# Patient Record
Sex: FEMALE | Race: WHITE | NOT HISPANIC OR LATINO | URBAN - METROPOLITAN AREA
[De-identification: names, ages, dates, MRNs, and addresses within clinical notes are randomized per-mention and may not be internally consistent; named-entity substitution may affect disease eponyms.]

---

## 2018-01-01 ENCOUNTER — OFFICE (OUTPATIENT)
Dept: URBAN - METROPOLITAN AREA CLINIC 75 | Facility: CLINIC | Age: 78
End: 2018-01-01

## 2018-01-01 VITALS
DIASTOLIC BLOOD PRESSURE: 70 MMHG | HEART RATE: 74 BPM | HEIGHT: 61 IN | SYSTOLIC BLOOD PRESSURE: 123 MMHG | WEIGHT: 137 LBS

## 2018-01-01 DIAGNOSIS — D50.9 IRON DEFICIENCY ANEMIA, UNSPECIFIED: ICD-10-CM

## 2018-01-01 DIAGNOSIS — K52.89 OTHER SPECIFIED NONINFECTIVE GASTROENTERITIS AND COLITIS: ICD-10-CM

## 2018-01-01 DIAGNOSIS — R93.3 ABNORMAL FINDINGS ON DIAGNOSTIC IMAGING OF OTHER PARTS OF DI: ICD-10-CM

## 2018-01-01 DIAGNOSIS — K21.9 GASTRO-ESOPHAGEAL REFLUX DISEASE WITHOUT ESOPHAGITIS: ICD-10-CM

## 2018-01-01 DIAGNOSIS — K30 FUNCTIONAL DYSPEPSIA: ICD-10-CM

## 2018-01-01 DIAGNOSIS — K80.20 CALCULUS OF GALLBLADDER WITHOUT CHOLECYSTITIS WITHOUT OBSTRU: ICD-10-CM

## 2018-01-01 DIAGNOSIS — Z79.02 LONG TERM (CURRENT) USE OF ANTITHROMBOTICS/ANTIPLATELETS: ICD-10-CM

## 2018-01-01 DIAGNOSIS — I25.10 ATHEROSCLEROTIC HEART DISEASE OF NATIVE CORONARY ARTERY WITH: ICD-10-CM

## 2018-01-01 DIAGNOSIS — Z95.5 PRESENCE OF CORONARY ANGIOPLASTY IMPLANT AND GRAFT: ICD-10-CM

## 2018-01-01 DIAGNOSIS — Z79.82 LONG TERM (CURRENT) USE OF ASPIRIN: ICD-10-CM

## 2018-01-01 PROCEDURE — 99213 OFFICE O/P EST LOW 20 MIN: CPT | Performed by: INTERNAL MEDICINE

## 2018-01-01 RX ORDER — BUDESONIDE 3 MG/1
9 CAPSULE ORAL
Qty: 90 | Refills: 5 | Status: COMPLETED
Start: 2018-01-01 | End: 2019-01-01

## 2018-01-01 RX ORDER — ESOMEPRAZOLE MAGNESIUM 40 MG/1
40 CAPSULE, DELAYED RELEASE ORAL
Qty: 30 | Refills: 11 | Status: COMPLETED
Start: 2018-07-11 | End: 2018-01-01

## 2018-06-26 ENCOUNTER — OFFICE VISIT CONVERTED (OUTPATIENT)
Dept: ONCOLOGY | Facility: HOSPITAL | Age: 78
End: 2018-06-26
Attending: INTERNAL MEDICINE

## 2018-07-11 ENCOUNTER — OFFICE (OUTPATIENT)
Dept: URBAN - METROPOLITAN AREA CLINIC 75 | Facility: CLINIC | Age: 78
End: 2018-07-11

## 2018-07-11 VITALS
HEIGHT: 61 IN | DIASTOLIC BLOOD PRESSURE: 58 MMHG | HEART RATE: 64 BPM | SYSTOLIC BLOOD PRESSURE: 116 MMHG | WEIGHT: 137 LBS

## 2018-07-11 DIAGNOSIS — D50.9 IRON DEFICIENCY ANEMIA, UNSPECIFIED: ICD-10-CM

## 2018-07-11 DIAGNOSIS — R93.3 ABNORMAL FINDINGS ON DIAGNOSTIC IMAGING OF OTHER PARTS OF DI: ICD-10-CM

## 2018-07-11 DIAGNOSIS — K21.9 GASTRO-ESOPHAGEAL REFLUX DISEASE WITHOUT ESOPHAGITIS: ICD-10-CM

## 2018-07-11 DIAGNOSIS — K80.20 CALCULUS OF GALLBLADDER WITHOUT CHOLECYSTITIS WITHOUT OBSTRU: ICD-10-CM

## 2018-07-11 DIAGNOSIS — K52.89 OTHER SPECIFIED NONINFECTIVE GASTROENTERITIS AND COLITIS: ICD-10-CM

## 2018-07-11 DIAGNOSIS — Z79.82 LONG TERM (CURRENT) USE OF ASPIRIN: ICD-10-CM

## 2018-07-11 DIAGNOSIS — Z95.5 PRESENCE OF CORONARY ANGIOPLASTY IMPLANT AND GRAFT: ICD-10-CM

## 2018-07-11 DIAGNOSIS — I25.10 ATHEROSCLEROTIC HEART DISEASE OF NATIVE CORONARY ARTERY WITH: ICD-10-CM

## 2018-07-11 DIAGNOSIS — Z79.02 LONG TERM (CURRENT) USE OF ANTITHROMBOTICS/ANTIPLATELETS: ICD-10-CM

## 2018-07-11 DIAGNOSIS — K30 FUNCTIONAL DYSPEPSIA: ICD-10-CM

## 2018-07-11 PROCEDURE — 99214 OFFICE O/P EST MOD 30 MIN: CPT | Performed by: INTERNAL MEDICINE

## 2018-07-11 RX ORDER — BUDESONIDE 3 MG/1
9 CAPSULE ORAL
Qty: 90 | Refills: 3 | Status: ACTIVE
Start: 2018-07-11

## 2018-07-11 RX ORDER — ESOMEPRAZOLE MAGNESIUM 40 MG/1
40 CAPSULE, DELAYED RELEASE ORAL
Qty: 30 | Refills: 11 | Status: COMPLETED
Start: 2018-07-11 | End: 2018-01-01

## 2018-08-07 ENCOUNTER — OFFICE VISIT CONVERTED (OUTPATIENT)
Dept: ONCOLOGY | Facility: HOSPITAL | Age: 78
End: 2018-08-07
Attending: NURSE PRACTITIONER

## 2018-11-19 ENCOUNTER — OFFICE VISIT CONVERTED (OUTPATIENT)
Dept: ONCOLOGY | Facility: HOSPITAL | Age: 78
End: 2018-11-19
Attending: INTERNAL MEDICINE

## 2019-01-01 ENCOUNTER — OFFICE (OUTPATIENT)
Dept: URBAN - METROPOLITAN AREA CLINIC 75 | Facility: CLINIC | Age: 79
End: 2019-01-01

## 2019-01-01 VITALS
HEIGHT: 61 IN | DIASTOLIC BLOOD PRESSURE: 78 MMHG | SYSTOLIC BLOOD PRESSURE: 120 MMHG | RESPIRATION RATE: 16 BRPM | HEART RATE: 72 BPM | WEIGHT: 117 LBS

## 2019-01-01 VITALS
WEIGHT: 120 LBS | SYSTOLIC BLOOD PRESSURE: 118 MMHG | HEIGHT: 61 IN | RESPIRATION RATE: 16 BRPM | HEART RATE: 64 BPM | DIASTOLIC BLOOD PRESSURE: 78 MMHG

## 2019-01-01 DIAGNOSIS — K21.9 GASTRO-ESOPHAGEAL REFLUX DISEASE WITHOUT ESOPHAGITIS: ICD-10-CM

## 2019-01-01 DIAGNOSIS — D50.9 IRON DEFICIENCY ANEMIA, UNSPECIFIED: ICD-10-CM

## 2019-01-01 DIAGNOSIS — Z79.82 LONG TERM (CURRENT) USE OF ASPIRIN: ICD-10-CM

## 2019-01-01 DIAGNOSIS — I25.10 ATHEROSCLEROTIC HEART DISEASE OF NATIVE CORONARY ARTERY WITH: ICD-10-CM

## 2019-01-01 DIAGNOSIS — K52.89 OTHER SPECIFIED NONINFECTIVE GASTROENTERITIS AND COLITIS: ICD-10-CM

## 2019-01-01 DIAGNOSIS — K30 FUNCTIONAL DYSPEPSIA: ICD-10-CM

## 2019-01-01 DIAGNOSIS — K80.20 CALCULUS OF GALLBLADDER WITHOUT CHOLECYSTITIS WITHOUT OBSTRU: ICD-10-CM

## 2019-01-01 DIAGNOSIS — R93.3 ABNORMAL FINDINGS ON DIAGNOSTIC IMAGING OF OTHER PARTS OF DI: ICD-10-CM

## 2019-01-01 DIAGNOSIS — Z79.02 LONG TERM (CURRENT) USE OF ANTITHROMBOTICS/ANTIPLATELETS: ICD-10-CM

## 2019-01-01 DIAGNOSIS — A04.72 ENTEROCOLITIS DUE TO CLOSTRIDIUM DIFFICILE, NOT SPECIFIED AS: ICD-10-CM

## 2019-01-01 DIAGNOSIS — Z95.5 PRESENCE OF CORONARY ANGIOPLASTY IMPLANT AND GRAFT: ICD-10-CM

## 2019-01-01 PROCEDURE — 99214 OFFICE O/P EST MOD 30 MIN: CPT | Performed by: NURSE PRACTITIONER

## 2019-01-01 RX ORDER — VANCOMYCIN HYDROCHLORIDE 125 MG/1
500 CAPSULE ORAL
Qty: 56 | Refills: 0 | Status: ACTIVE
Start: 2019-01-01

## 2019-03-30 ENCOUNTER — HOSPITAL ENCOUNTER (OUTPATIENT)
Dept: OTHER | Facility: HOSPITAL | Age: 79
Discharge: HOME | End: 2019-03-30

## 2019-03-30 LAB
25(OH)D3 SERPL-MCNC: 26.4 NG/ML (ref 30–100)
ALBUMIN SERPL-MCNC: 2.8 G/DL (ref 3.5–5)
ALBUMIN/GLOB SERPL: 0.8 {RATIO} (ref 1.4–2.6)
ALP SERPL-CCNC: 115 U/L (ref 43–160)
ALT SERPL-CCNC: 18 U/L (ref 10–40)
ANION GAP SERPL CALC-SCNC: 18 MMOL/L (ref 8–19)
APPEARANCE UR: ABNORMAL
AST SERPL-CCNC: 23 U/L (ref 15–50)
BASOPHILS # BLD AUTO: 0.04 10*3/UL (ref 0–0.2)
BASOPHILS # BLD: 0 % (ref 0–3)
BASOPHILS NFR BLD AUTO: 0.1 % (ref 0–3)
BILIRUB SERPL-MCNC: 0.28 MG/DL (ref 0.2–1.3)
BILIRUB UR QL: NEGATIVE
BUN SERPL-MCNC: 22 MG/DL (ref 5–25)
BUN/CREAT SERPL: 29 {RATIO} (ref 6–20)
CALCIUM SERPL-MCNC: 10.5 MG/DL (ref 8.7–10.4)
CHLORIDE SERPL-SCNC: 96 MMOL/L (ref 99–111)
COLOR UR: YELLOW
CONV ABS BANDS: 0 % (ref 1–5)
CONV ABS IMM GRAN: 0.9 10*3/UL (ref 0–0.2)
CONV ANISOCYTES: SLIGHT
CONV BACTERIA: ABNORMAL
CONV CO2: 24 MMOL/L (ref 22–32)
CONV COLLECTION SOURCE (UA): ABNORMAL
CONV HYALINE CASTS IN URINE MICRO: ABNORMAL /[LPF]
CONV HYPOCHROMIA IN BLOOD BY LIGHT MICROSCOPY: SLIGHT
CONV IMMATURE GRAN: 3.2 % (ref 0–1.8)
CONV SEGMENTED NEUTROPHILS: 88 % (ref 45–70)
CONV TOTAL PROTEIN: 6.4 G/DL (ref 6.3–8.2)
CONV UROBILINOGEN IN URINE BY AUTOMATED TEST STRIP: 0.2 {EHRLICHU}/DL (ref 0.1–1)
CREAT UR-MCNC: 0.75 MG/DL (ref 0.5–0.9)
DEPRECATED RDW RBC AUTO: 50.8 FL (ref 36.4–46.3)
EOSINOPHIL # BLD AUTO: 0 % (ref 0–7)
EOSINOPHIL # BLD AUTO: 0 10*3/UL (ref 0–0.7)
EOSINOPHIL NFR BLD AUTO: 0 % (ref 0–7)
ERYTHROCYTE [DISTWIDTH] IN BLOOD BY AUTOMATED COUNT: 16.6 % (ref 11.7–14.4)
GFR SERPLBLD BASED ON 1.73 SQ M-ARVRAT: >60 ML/MIN/{1.73_M2}
GIANT PLATELETS: SLIGHT
GLOBULIN UR ELPH-MCNC: 3.6 G/DL (ref 2–3.5)
GLUCOSE SERPL-MCNC: 108 MG/DL (ref 65–99)
GLUCOSE UR QL: NEGATIVE MG/DL
HBA1C MFR BLD: 9.1 G/DL (ref 12–16)
HCT VFR BLD AUTO: 28.3 % (ref 37–47)
HGB UR QL STRIP: NEGATIVE
KETONES UR QL STRIP: NEGATIVE MG/DL
LARGE PLATELETS: SLIGHT
LEUKOCYTE ESTERASE UR QL STRIP: ABNORMAL
LYMPHOCYTES # BLD AUTO: 1.7 10*3/UL (ref 1–5)
MCH RBC QN AUTO: 27.4 PG (ref 27–31)
MCHC RBC AUTO-ENTMCNC: 32.2 G/DL (ref 33–37)
MCV RBC AUTO: 85.2 FL (ref 81–99)
MICROCYTES BLD QL: SLIGHT
MONOCYTES # BLD AUTO: 1.43 10*3/UL (ref 0.2–1.2)
MONOCYTES NFR BLD AUTO: 5.1 % (ref 3–10)
MONOCYTES NFR BLD MANUAL: 4 % (ref 3–10)
NEUTROPHILS # BLD AUTO: 24.22 10*3/UL (ref 2–8)
NEUTROPHILS NFR BLD AUTO: 85.6 % (ref 30–85)
NITRITE UR QL STRIP: POSITIVE
NRBC CBCN: 0 % (ref 0–0.7)
NUC CELL # PRT MANUAL: 0 /100{WBCS}
OSMOLALITY SERPL CALC.SUM OF ELEC: 284 MOSM/KG (ref 273–304)
PH UR STRIP.AUTO: 5.5 [PH] (ref 5–8)
PLAT MORPH BLD: ABNORMAL
PLATELET # BLD AUTO: 369 10*3/UL (ref 130–400)
PMV BLD AUTO: 10.1 FL (ref 9.4–12.3)
POLYCHROMASIA BLD QL SMEAR: SLIGHT
POTASSIUM SERPL-SCNC: 3.4 MMOL/L (ref 3.5–5.3)
PROT UR QL: NEGATIVE MG/DL
RBC # BLD AUTO: 3.32 10*6/UL (ref 4.2–5.4)
RBC #/AREA URNS HPF: ABNORMAL /[HPF]
SMALL PLATELETS BLD QL SMEAR: ADEQUATE
SODIUM SERPL-SCNC: 135 MMOL/L (ref 135–147)
SP GR UR: 1.02 (ref 1–1.03)
SQUAMOUS SPT QL MICRO: ABNORMAL /[HPF]
VARIANT LYMPHS NFR BLD MANUAL: 6 % (ref 20–45)
VARIANT LYMPHS NFR BLD MANUAL: 8 % (ref 20–45)
WBC # BLD AUTO: 28.29 10*3/UL (ref 4.8–10.8)
WBC #/AREA URNS HPF: ABNORMAL /[HPF]

## 2019-03-31 ENCOUNTER — HOSPITAL ENCOUNTER (OUTPATIENT)
Dept: OTHER | Facility: HOSPITAL | Age: 79
Discharge: HOME | End: 2019-03-31

## 2019-03-31 LAB
BASOPHILS # BLD AUTO: 0.07 10*3/UL (ref 0–0.2)
BASOPHILS # BLD: 0 % (ref 0–3)
BASOPHILS NFR BLD AUTO: 0.2 % (ref 0–3)
CONV ABS BANDS: 0 % (ref 1–5)
CONV ABS IMM GRAN: 0.65 10*3/UL (ref 0–0.2)
CONV ANISOCYTES: SLIGHT
CONV ATYPICAL LYMPHOCYTES: 1 % (ref 0–5)
CONV HYPOCHROMIA IN BLOOD BY LIGHT MICROSCOPY: SLIGHT
CONV IMMATURE GRAN: 1.7 % (ref 0–1.8)
CONV SEGMENTED NEUTROPHILS: 89 % (ref 45–70)
DEPRECATED RDW RBC AUTO: 52.2 FL (ref 36.4–46.3)
EOSINOPHIL # BLD AUTO: 0.03 10*3/UL (ref 0–0.7)
EOSINOPHIL # BLD AUTO: 0.1 % (ref 0–7)
EOSINOPHIL NFR BLD AUTO: 0 % (ref 0–7)
ERYTHROCYTE [DISTWIDTH] IN BLOOD BY AUTOMATED COUNT: 16.8 % (ref 11.7–14.4)
HBA1C MFR BLD: 9.2 G/DL (ref 12–16)
HCT VFR BLD AUTO: 29.5 % (ref 37–47)
LARGE PLATELETS: ABNORMAL
LYMPHOCYTES # BLD AUTO: 3.86 10*3/UL (ref 1–5)
MAGNESIUM SERPL-MCNC: 1.35 MG/DL (ref 1.6–2.3)
MCH RBC QN AUTO: 26.7 PG (ref 27–31)
MCHC RBC AUTO-ENTMCNC: 31.2 G/DL (ref 33–37)
MCV RBC AUTO: 85.8 FL (ref 81–99)
MICROCYTES BLD QL: SLIGHT
MONOCYTES # BLD AUTO: 2.17 10*3/UL (ref 0.2–1.2)
MONOCYTES NFR BLD AUTO: 5.5 % (ref 3–10)
MONOCYTES NFR BLD MANUAL: 2 % (ref 3–10)
NEUTROPHILS # BLD AUTO: 32.42 10*3/UL (ref 2–8)
NEUTROPHILS NFR BLD AUTO: 82.7 % (ref 30–85)
NRBC CBCN: 0 % (ref 0–0.7)
NUC CELL # PRT MANUAL: 0 /100{WBCS}
PLAT MORPH BLD: NORMAL
PLATELET # BLD AUTO: 482 10*3/UL (ref 130–400)
PMV BLD AUTO: 9.8 FL (ref 9.4–12.3)
POLYCHROMASIA BLD QL SMEAR: SLIGHT
RBC # BLD AUTO: 3.44 10*6/UL (ref 4.2–5.4)
SMALL PLATELETS BLD QL SMEAR: ABNORMAL
SMUDGE CELLS IN BLOOD BY LIGHT MICROSCOPY: ABNORMAL
VARIANT LYMPHS NFR BLD MANUAL: 8 % (ref 20–45)
VARIANT LYMPHS NFR BLD MANUAL: 9.8 % (ref 20–45)
WBC # BLD AUTO: 39.2 10*3/UL (ref 4.8–10.8)

## 2019-04-01 ENCOUNTER — HOSPITAL ENCOUNTER (OUTPATIENT)
Dept: OTHER | Facility: HOSPITAL | Age: 79
Discharge: HOME | End: 2019-04-01

## 2019-04-01 LAB
AMOXICILLIN+CLAV SUSC ISLT: <=2
AMPICILLIN SUSC ISLT: >=32
AMPICILLIN+SULBAC SUSC ISLT: 8
ANION GAP SERPL CALC-SCNC: 16 MMOL/L (ref 8–19)
BACTERIA UR CULT: ABNORMAL
BASOPHILS # BLD AUTO: 0.05 10*3/UL (ref 0–0.2)
BASOPHILS NFR BLD AUTO: 0.3 % (ref 0–3)
BUN SERPL-MCNC: 15 MG/DL (ref 5–25)
BUN/CREAT SERPL: 23 {RATIO} (ref 6–20)
C DIFF TOX B STL QL CT TISS CULT: POSITIVE
CALCIUM SERPL-MCNC: 9.1 MG/DL (ref 8.7–10.4)
CEFAZOLIN SUSC ISLT: >=64
CEFEPIME SUSC ISLT: <=1
CEFTAZIDIME SUSC ISLT: <=1
CEFTRIAXONE SUSC ISLT: <=1
CEFUROXIME ORAL SUSC ISLT: <=1
CEFUROXIME PARENTER SUSC ISLT: <=1
CHLORIDE SERPL-SCNC: 99 MMOL/L (ref 99–111)
CIPROFLOXACIN SUSC ISLT: <=0.25
CONV 027 TOXIN: NEGATIVE
CONV ABS IMM GRAN: 0.25 10*3/UL (ref 0–0.2)
CONV CO2: 28 MMOL/L (ref 22–32)
CONV IMMATURE GRAN: 1.4 % (ref 0–1.8)
CREAT UR-MCNC: 0.66 MG/DL (ref 0.5–0.9)
DEPRECATED RDW RBC AUTO: 53.1 FL (ref 36.4–46.3)
EOSINOPHIL # BLD AUTO: 0.08 10*3/UL (ref 0–0.7)
EOSINOPHIL # BLD AUTO: 0.4 % (ref 0–7)
ERTAPENEM SUSC ISLT: <=0.5
ERYTHROCYTE [DISTWIDTH] IN BLOOD BY AUTOMATED COUNT: 16.8 % (ref 11.7–14.4)
GENTAMICIN SUSC ISLT: <=1
GFR SERPLBLD BASED ON 1.73 SQ M-ARVRAT: >60 ML/MIN/{1.73_M2}
GLUCOSE SERPL-MCNC: 130 MG/DL (ref 65–99)
HBA1C MFR BLD: 9.2 G/DL (ref 12–16)
HCT VFR BLD AUTO: 29.9 % (ref 37–47)
LEVOFLOXACIN SUSC ISLT: <=0.12
LYMPHOCYTES # BLD AUTO: 2.64 10*3/UL (ref 1–5)
MCH RBC QN AUTO: 27.1 PG (ref 27–31)
MCHC RBC AUTO-ENTMCNC: 30.8 G/DL (ref 33–37)
MCV RBC AUTO: 87.9 FL (ref 81–99)
MONOCYTES # BLD AUTO: 1.26 10*3/UL (ref 0.2–1.2)
MONOCYTES NFR BLD AUTO: 6.9 % (ref 3–10)
NEUTROPHILS # BLD AUTO: 13.98 10*3/UL (ref 2–8)
NEUTROPHILS NFR BLD AUTO: 76.5 % (ref 30–85)
NITROFURANTOIN SUSC ISLT: 32
NRBC CBCN: 0 % (ref 0–0.7)
OSMOLALITY SERPL CALC.SUM OF ELEC: 291 MOSM/KG (ref 273–304)
PLATELET # BLD AUTO: 398 10*3/UL (ref 130–400)
PMV BLD AUTO: 10 FL (ref 9.4–12.3)
POTASSIUM SERPL-SCNC: 3.6 MMOL/L (ref 3.5–5.3)
RBC # BLD AUTO: 3.4 10*6/UL (ref 4.2–5.4)
SODIUM SERPL-SCNC: 139 MMOL/L (ref 135–147)
TETRACYCLINE SUSC ISLT: <=1
TMP SMX SUSC ISLT: <=20
TOBRAMYCIN SUSC ISLT: <=1
VARIANT LYMPHS NFR BLD MANUAL: 14.5 % (ref 20–45)
WBC # BLD AUTO: 18.26 10*3/UL (ref 4.8–10.8)

## 2019-04-02 ENCOUNTER — HOSPITAL ENCOUNTER (OUTPATIENT)
Dept: OTHER | Facility: HOSPITAL | Age: 79
Discharge: HOME | End: 2019-04-02

## 2019-04-02 LAB
ANION GAP SERPL CALC-SCNC: 14 MMOL/L (ref 8–19)
BASOPHILS # BLD AUTO: 0.06 10*3/UL (ref 0–0.2)
BASOPHILS NFR BLD AUTO: 0.3 % (ref 0–3)
BUN SERPL-MCNC: 16 MG/DL (ref 5–25)
BUN/CREAT SERPL: 26 {RATIO} (ref 6–20)
CALCIUM SERPL-MCNC: 9.2 MG/DL (ref 8.7–10.4)
CHLORIDE SERPL-SCNC: 105 MMOL/L (ref 99–111)
CONV ABS IMM GRAN: 0.29 10*3/UL (ref 0–0.2)
CONV CO2: 28 MMOL/L (ref 22–32)
CONV IMMATURE GRAN: 1.5 % (ref 0–1.8)
CREAT UR-MCNC: 0.61 MG/DL (ref 0.5–0.9)
DEPRECATED RDW RBC AUTO: 52.4 FL (ref 36.4–46.3)
EOSINOPHIL # BLD AUTO: 0.05 10*3/UL (ref 0–0.7)
EOSINOPHIL # BLD AUTO: 0.3 % (ref 0–7)
ERYTHROCYTE [DISTWIDTH] IN BLOOD BY AUTOMATED COUNT: 16.8 % (ref 11.7–14.4)
GFR SERPLBLD BASED ON 1.73 SQ M-ARVRAT: >60 ML/MIN/{1.73_M2}
GLUCOSE SERPL-MCNC: 113 MG/DL (ref 65–99)
HBA1C MFR BLD: 8.6 G/DL (ref 12–16)
HCT VFR BLD AUTO: 27.3 % (ref 37–47)
LYMPHOCYTES # BLD AUTO: 2.69 10*3/UL (ref 1–5)
MCH RBC QN AUTO: 27 PG (ref 27–31)
MCHC RBC AUTO-ENTMCNC: 31.5 G/DL (ref 33–37)
MCV RBC AUTO: 85.6 FL (ref 81–99)
MONOCYTES # BLD AUTO: 1.38 10*3/UL (ref 0.2–1.2)
MONOCYTES NFR BLD AUTO: 7 % (ref 3–10)
NEUTROPHILS # BLD AUTO: 15.18 10*3/UL (ref 2–8)
NEUTROPHILS NFR BLD AUTO: 77.2 % (ref 30–85)
NRBC CBCN: 0 % (ref 0–0.7)
OSMOLALITY SERPL CALC.SUM OF ELEC: 298 MOSM/KG (ref 273–304)
PLATELET # BLD AUTO: 386 10*3/UL (ref 130–400)
PMV BLD AUTO: 9.8 FL (ref 9.4–12.3)
POTASSIUM SERPL-SCNC: 3.9 MMOL/L (ref 3.5–5.3)
RBC # BLD AUTO: 3.19 10*6/UL (ref 4.2–5.4)
SODIUM SERPL-SCNC: 143 MMOL/L (ref 135–147)
VARIANT LYMPHS NFR BLD MANUAL: 13.7 % (ref 20–45)
WBC # BLD AUTO: 19.65 10*3/UL (ref 4.8–10.8)

## 2019-04-03 LAB — BACTERIA SPEC AEROBE CULT: NORMAL

## 2019-04-04 ENCOUNTER — HOSPITAL ENCOUNTER (OUTPATIENT)
Dept: OTHER | Facility: HOSPITAL | Age: 79
Discharge: HOME | End: 2019-04-04

## 2019-04-04 LAB
ANION GAP SERPL CALC-SCNC: 22 MMOL/L (ref 8–19)
BASOPHILS # BLD AUTO: 0.11 10*3/UL (ref 0–0.2)
BASOPHILS NFR BLD AUTO: 0.4 % (ref 0–3)
BUN SERPL-MCNC: 15 MG/DL (ref 5–25)
BUN/CREAT SERPL: 16 {RATIO} (ref 6–20)
CALCIUM SERPL-MCNC: 9.4 MG/DL (ref 8.7–10.4)
CHLORIDE SERPL-SCNC: 100 MMOL/L (ref 99–111)
CONV ABS IMM GRAN: 0.82 10*3/UL (ref 0–0.2)
CONV CO2: 23 MMOL/L (ref 22–32)
CONV IMMATURE GRAN: 2.7 % (ref 0–1.8)
CREAT UR-MCNC: 0.94 MG/DL (ref 0.5–0.9)
DEPRECATED RDW RBC AUTO: 57 FL (ref 36.4–46.3)
EOSINOPHIL # BLD AUTO: 0.14 10*3/UL (ref 0–0.7)
EOSINOPHIL # BLD AUTO: 0.5 % (ref 0–7)
ERYTHROCYTE [DISTWIDTH] IN BLOOD BY AUTOMATED COUNT: 18 % (ref 11.7–14.4)
GFR SERPLBLD BASED ON 1.73 SQ M-ARVRAT: 59 ML/MIN/{1.73_M2}
GLUCOSE SERPL-MCNC: 104 MG/DL (ref 65–99)
HBA1C MFR BLD: 9.7 G/DL (ref 12–16)
HCT VFR BLD AUTO: 31.8 % (ref 37–47)
LYMPHOCYTES # BLD AUTO: 4.77 10*3/UL (ref 1–5)
MCH RBC QN AUTO: 27.2 PG (ref 27–31)
MCHC RBC AUTO-ENTMCNC: 30.5 G/DL (ref 33–37)
MCV RBC AUTO: 89.1 FL (ref 81–99)
MONOCYTES # BLD AUTO: 1.88 10*3/UL (ref 0.2–1.2)
MONOCYTES NFR BLD AUTO: 6.2 % (ref 3–10)
NEUTROPHILS # BLD AUTO: 22.66 10*3/UL (ref 2–8)
NEUTROPHILS NFR BLD AUTO: 74.5 % (ref 30–85)
NRBC CBCN: 0 % (ref 0–0.7)
OSMOLALITY SERPL CALC.SUM OF ELEC: 293 MOSM/KG (ref 273–304)
PLATELET # BLD AUTO: 521 10*3/UL (ref 130–400)
PMV BLD AUTO: 10.1 FL (ref 9.4–12.3)
POTASSIUM SERPL-SCNC: 4 MMOL/L (ref 3.5–5.3)
RBC # BLD AUTO: 3.57 10*6/UL (ref 4.2–5.4)
SODIUM SERPL-SCNC: 141 MMOL/L (ref 135–147)
VARIANT LYMPHS NFR BLD MANUAL: 15.7 % (ref 20–45)
WBC # BLD AUTO: 30.38 10*3/UL (ref 4.8–10.8)

## 2019-04-05 ENCOUNTER — HOSPITAL ENCOUNTER (OUTPATIENT)
Dept: OTHER | Facility: HOSPITAL | Age: 79
Discharge: HOME | End: 2019-04-05

## 2019-04-05 LAB
BASOPHILS # BLD AUTO: 0.1 10*3/UL (ref 0–0.2)
BASOPHILS NFR BLD AUTO: 0.4 % (ref 0–3)
CONV ABS IMM GRAN: 0.79 10*3/UL (ref 0–0.2)
CONV IMMATURE GRAN: 3.3 % (ref 0–1.8)
DEPRECATED RDW RBC AUTO: 58.4 FL (ref 36.4–46.3)
EOSINOPHIL # BLD AUTO: 0.17 10*3/UL (ref 0–0.7)
EOSINOPHIL # BLD AUTO: 0.7 % (ref 0–7)
ERYTHROCYTE [DISTWIDTH] IN BLOOD BY AUTOMATED COUNT: 18.2 % (ref 11.7–14.4)
HBA1C MFR BLD: 8.6 G/DL (ref 12–16)
HCT VFR BLD AUTO: 29 % (ref 37–47)
LYMPHOCYTES # BLD AUTO: 2.53 10*3/UL (ref 1–5)
MCH RBC QN AUTO: 26.5 PG (ref 27–31)
MCHC RBC AUTO-ENTMCNC: 29.7 G/DL (ref 33–37)
MCV RBC AUTO: 89.2 FL (ref 81–99)
MONOCYTES # BLD AUTO: 1.49 10*3/UL (ref 0.2–1.2)
MONOCYTES NFR BLD AUTO: 6.3 % (ref 3–10)
NEUTROPHILS # BLD AUTO: 18.56 10*3/UL (ref 2–8)
NEUTROPHILS NFR BLD AUTO: 78.6 % (ref 30–85)
NRBC CBCN: 0 % (ref 0–0.7)
PLATELET # BLD AUTO: 445 10*3/UL (ref 130–400)
PMV BLD AUTO: 10 FL (ref 9.4–12.3)
RBC # BLD AUTO: 3.25 10*6/UL (ref 4.2–5.4)
VARIANT LYMPHS NFR BLD MANUAL: 10.7 % (ref 20–45)
WBC # BLD AUTO: 23.64 10*3/UL (ref 4.8–10.8)

## 2019-04-09 ENCOUNTER — HOSPITAL ENCOUNTER (OUTPATIENT)
Dept: OTHER | Facility: HOSPITAL | Age: 79
Discharge: HOME | End: 2019-04-09

## 2019-04-09 LAB
ANION GAP SERPL CALC-SCNC: 19 MMOL/L (ref 8–19)
BASOPHILS # BLD AUTO: 0.06 10*3/UL (ref 0–0.2)
BASOPHILS NFR BLD AUTO: 0.5 % (ref 0–3)
BUN SERPL-MCNC: 9 MG/DL (ref 5–25)
BUN/CREAT SERPL: 14 {RATIO} (ref 6–20)
CALCIUM SERPL-MCNC: 8.4 MG/DL (ref 8.7–10.4)
CHLORIDE SERPL-SCNC: 111 MMOL/L (ref 99–111)
CONV ABS IMM GRAN: 0.22 10*3/UL (ref 0–0.2)
CONV ANISOCYTES: SLIGHT
CONV CO2: 21 MMOL/L (ref 22–32)
CONV HYPOCHROMIA IN BLOOD BY LIGHT MICROSCOPY: SLIGHT
CONV IMMATURE GRAN: 1.8 % (ref 0–1.8)
CREAT UR-MCNC: 0.65 MG/DL (ref 0.5–0.9)
DEPRECATED RDW RBC AUTO: 65.7 FL (ref 36.4–46.3)
EOSINOPHIL # BLD AUTO: 0.19 10*3/UL (ref 0–0.7)
EOSINOPHIL # BLD AUTO: 1.6 % (ref 0–7)
ERYTHROCYTE [DISTWIDTH] IN BLOOD BY AUTOMATED COUNT: 19.6 % (ref 11.7–14.4)
GFR SERPLBLD BASED ON 1.73 SQ M-ARVRAT: >60 ML/MIN/{1.73_M2}
GLUCOSE SERPL-MCNC: 84 MG/DL (ref 65–99)
HBA1C MFR BLD: 8.3 G/DL (ref 12–16)
HCT VFR BLD AUTO: 28.9 % (ref 37–47)
LYMPHOCYTES # BLD AUTO: 2.68 10*3/UL (ref 1–5)
MACROCYTES BLD QL SMEAR: SLIGHT
MAGNESIUM SERPL-MCNC: 1.78 MG/DL (ref 1.6–2.3)
MCH RBC QN AUTO: 26.8 PG (ref 27–31)
MCHC RBC AUTO-ENTMCNC: 28.7 G/DL (ref 33–37)
MCV RBC AUTO: 93.2 FL (ref 81–99)
MONOCYTES # BLD AUTO: 0.9 10*3/UL (ref 0.2–1.2)
MONOCYTES NFR BLD AUTO: 7.3 % (ref 3–10)
NEUTROPHILS # BLD AUTO: 8.2 10*3/UL (ref 2–8)
NEUTROPHILS NFR BLD AUTO: 66.9 % (ref 30–85)
NRBC CBCN: 0 % (ref 0–0.7)
OSMOLALITY SERPL CALC.SUM OF ELEC: 300 MOSM/KG (ref 273–304)
OVALOCYTES BLD QL SMEAR: SLIGHT
PLATELET # BLD AUTO: 397 10*3/UL (ref 130–400)
PMV BLD AUTO: 9.6 FL (ref 9.4–12.3)
POIKILOCYTOSIS BLD QL SMEAR: SLIGHT
POTASSIUM SERPL-SCNC: 4.6 MMOL/L (ref 3.5–5.3)
RBC # BLD AUTO: 3.1 10*6/UL (ref 4.2–5.4)
SODIUM SERPL-SCNC: 146 MMOL/L (ref 135–147)
VARIANT LYMPHS NFR BLD MANUAL: 21.9 % (ref 20–45)
WBC # BLD AUTO: 12.25 10*3/UL (ref 4.8–10.8)

## 2021-05-28 VITALS
HEART RATE: 75 BPM | HEIGHT: 60 IN | TEMPERATURE: 97.9 F | SYSTOLIC BLOOD PRESSURE: 145 MMHG | BODY MASS INDEX: 26.97 KG/M2 | OXYGEN SATURATION: 97 % | DIASTOLIC BLOOD PRESSURE: 56 MMHG | WEIGHT: 137.35 LBS

## 2021-05-28 VITALS
DIASTOLIC BLOOD PRESSURE: 51 MMHG | HEIGHT: 60 IN | WEIGHT: 137 LBS | HEART RATE: 65 BPM | OXYGEN SATURATION: 96 % | BODY MASS INDEX: 26.9 KG/M2 | SYSTOLIC BLOOD PRESSURE: 128 MMHG | TEMPERATURE: 98.3 F

## 2021-05-28 VITALS
TEMPERATURE: 98.2 F | HEART RATE: 61 BPM | BODY MASS INDEX: 27.09 KG/M2 | DIASTOLIC BLOOD PRESSURE: 53 MMHG | HEIGHT: 60 IN | SYSTOLIC BLOOD PRESSURE: 144 MMHG | OXYGEN SATURATION: 99 % | WEIGHT: 138.01 LBS

## 2021-05-28 NOTE — PROGRESS NOTES
Patient: HERMELINDA AGUILAR     Acct: [id number]     Report: #[id number]  UNIT #: [id number]     : 1940    Encounter Date:2018  PRIMARY CARE: PEMA MANNING  ***Signed***  --------------------------------------------------------------------------------------------------------------------  NURSE INTAKE      Encounter Date      2018            Providers      Referring Provider:  PEMA MANNING      Primary Provider:  PEMA MANNING            Visit Type      Established Patient Visit            Chief Complaint      leukocytosis            Allergies      Coded Allergies:             No Known Allergies (Unverified , 18)            Medications      Last Reconciled on 18 16:21 by NADEGE SHRESTHA MD      No Active Prescriptions or Reported Meds            PAST, FAMILY   Past Medical History      Past Medical History:  Yes Diabetes Type 2, Yes Thyroid Disease, Yes High     Cholesterol, Yes Heart Attack (X 2), Yes Low or High WBC Count      Hematology/oncology:  REPORTS HX OF: Skin cancer            Past Surgical History      REPORTS HX OF: Coronary stent, Appendectomy, Skin cancer removal, Hysterectomy            Family History      REPORTS HX OF: Skin Cancer (BROTHER)            Social History      Lives independently:  Yes      Occupation:  RETIRED            Tobacco Use      Tobacco status:  Current every day smoker      Smoking packs/day:  1      Quit status:  Not considering quitting            Alcohol Use      Alcohol intake:  None            Substance Use      Substance use:  Denies use            HISTORY OF PRESENT ILLNESS      History and Physical            Mrs. Aguilar is a very pleasant 77-year-old lady who is being referred to us     with anemia and leukocytosis. Patient has history of recurrent colitis as well     as urinary tract infections which have been treated with oral antibiotics. Most     recently patient was taking ciprofloxacin twice a day for one week which she      completed last week. Patient was complaining of diarrhea or vomiting and     abdominal discomfort which has somewhat improved but not completely resolved.     In addition to leukocytosis patient was also found to be anemic. She has     previous history of EGD and colonoscopy in 2013. Patient is in a wheelchair is     complaining of lower extremity edema.            Most Recent Lab Findings      Laboratory Tests      6/12/18 12:12            Laboratory Tests            Test        6/12/18      12:12             Ferritin        9 ng/mL      ()            REVIEW OF SYSTEMS      General:  Denies: Appetite change, Excessive sweating, Fatigue, Fever, Night     sweats, Weight gain, Weight loss, Other      Eyes:  Denies: Blurred vision, Corrective lenses, Diplopia, Eye irritation, Eye     pain, Eye redness, Spots in vision, Vision loss, Other      Ears, nose, mouth, throat:  Denies: Headache, Seizures, Visual Changes, Hearing     loss, Sinus Congestion, Hoarseness, Sore throat, Other      Cardiovascular:  Denies: Chest pain, Irregular heartbeat, Palpitations, Swollen     ankles/legs, Other      Respiratory:  Denies: Chest pain, Shortness of Air, Productive cough, Coughing     blood, Other      Gastrointestinal:  Denies: Nausea, Vomiting, Problem swallowing, Frequent     heartburn, Constipation, Diarrhea, Tarry stools, Bloody stools, Unable to     control bowels, Other      Kidney/Bladder:  Denies: Painful Urination, Change in urinary stream, Blood in     urine, Incontinence, Frequent Urination, Decreased urine stream, Other      Musculoskeletal:  Denies: New Back pain, Leg Cramps, Painful Joints, Swollen     Joints, Muscle Pain, Muscle weakness, Other      Skin:  DENIES: Jaundice, Easy Bleeding, Lesions/changes in moles, Nail changes,     Skin Discoloration, Rash, Other      Neurological:  Denies: Dizziness, Fainting, Numbness\Tingling, Paralysis,     Seizures, Other      Psychiatric:  Complains of: AAO X 3,  Denies: Anxiety, Panic attacks, Depression    , Memory loss, Other      Endocrine:  DiabetesThyroid DisorderOsteoporosisEndocrine Other      Hematologic/lymphatic:  Denies: Bruising, Bleeding, Enlarged Lymph Nodes,     Recurrent infections, Other      Reproductive:  Denies Pregnant, Denies Menopause, Denies Still Menstruating,     Denies Heavy Periods, Denies Other            VITAL SIGNS AND SCORES      Vitals      Height 5 ft 0.04 in / 152.5 cm      Weight 138 lbs 0.127 oz / 62.6 kg      BSA 1.65 m2      BMI 26.9 kg/m2      Temperature 98.2 F / 36.78 C - Temporal      Pulse 61      Blood Pressure 144/53 Sitting, Right Arm      Pulse Oximetry 99%, ROOM AIR            Pain Score      Pain Assessment:           Experiencing any pain?:  No         Pain Scale Used:  Numerical         Pain Intensity:  0            Fatigue Score               Experiencing any fatigue?:  No         Fatigue (0-10 scale):  0 (none)            EXAM      Vitals            Vital Signs              Date Time  Temp Pulse Resp B/P (MAP) Pulse Ox O2 Delivery O2 Flow Rate FiO2             6/12/18 11:26 97.7 67 18 119/49 100 RM AIR              General Appearance:  Alert, Oriented X3, Cooperative      Eyes:  Anicteric Sclerae, Moist Conjunctiva      HEENT:  Orophraynx clear, No Erythema, No Exudates      Neck:  Supple, Full ROM      Respiratory:  CTAB, No Diminished Breath      Abdomen\Gastro:  Soft, No NABS      Cardio:  RRR, No Murmur, No, Peripheral Edema      Skin:  Normal Temperature, No Induration      Psychiatric:  Appropriate Affect, Intact Judgement, AAO x3      Muscularskeletal:  Normal Gait and Station, Full ROM of extremeties      Lymphatic:  No Cervical, No Supraclavicular, No Infraclavicular, No Axillary,     No Inguinal, No Other            PREVENTION      Hx Influenza Vaccination:  Yes      Date Influenza Vaccine Given:  Oct 1, 2017      2 or More Falls Past Year?:  No      Fall Past Year with Injury?:  No      Hx Pneumococcal  Vaccination:  No      Encouraged to follow-up with:  PCP regarding preventative exams.      Chart initiated by      SONIA BENAVIDES CMA            IMPRESSION/PLAN      Current Plan            Mrs. Angel comes for a follow-up visit her diarrhea has improved with     antibiotics. But she is found to have significant iron deficiency with iron     saturation of only 7% and ferritin was 9. Her B12 and folate were within normal     limits I had a long discussion about iron supplementation and patient will like     to proceed with intravenous iron replacement therapy with Feraheme  2 infusion     and then we will see her back with a CBC to make sure her hemoglobin has     improved and leukocytosis has resolved. Patient is accompanied by her son today     and their questions were uncertain to their satisfaction.            Patient Education      Patient Education Provided:  Yes                 Disclaimer: Converted document may not contain table formatting or lab diagrams. Please see [employer] for the authenticated document.

## 2021-05-28 NOTE — PROGRESS NOTES
Patient: HERMELINDA AGUILAR     Acct: [id number]     Report: #[id number]  UNIT #: [id number]     : 1940    Encounter Date:2018  PRIMARY CARE: PEMA MANNING  ***Signed***  --------------------------------------------------------------------------------------------------------------------  NURSE INTAKE      Visit Type      Established Patient Visit            Chief Complaint      leukocytosis            Referring Provider/Copies To      Referring Provider:  PEMA MANNING            History and Present Illness      Past History      Mrs. Aguilar is a very pleasant 77-year-old lady who is being referred to us with    anemia and leukocytosis. Patient has history of recurrent colitis as well as     urinary tract infections which have been treated with oral antibiotics. Most     recently patient was taking ciprofloxacin twice a day for one week which she     completed last week. Patient was complaining of diarrhea or vomiting and     abdominal discomfort which has somewhat improved but not completely resolved. In    addition to leukocytosis patient was also found to be anemic. She has previous     history of EGD and colonoscopy in . Patient is in a wheelchair is     complaining of lower extremity edema.            -2018. Presents in WC with son for follow up for iron deficiency     anemia and leukocytosis. Received Fereheme infusions x 2 in  and July. Feels    much better after receiving. Taste alterations and tongue color has improved.     Fatigue has improved. WBC up to 18.0 at the last visit. Denies any B symptoms.     Has chronic skin cancers removed from lower extremities on a frequent basis and     therefore has increased lower extremity edema. Takes Lasix PRN. Also, has heart     murmur and anemia may be related chronic disease. B-12 of 388 on the low end of     normal. Will start patient on oral B-12, 500mcg,SL BID x 3 months and recheck at    the next visit as this may help  to improve her fatigue.      -August 7-2018.  WBC 20.4.  Hemoglobin 13.6.  Platelet count 281,000            HPI - Hematology Interim      Patient presents today with worsening fatigue in setting of anemia and     leukocytosis.            PAST, FAMILY   Social History      Lives independently:  Yes      Occupation:  RETIRED            Tobacco Use      Tobacco status:  Current every day smoker      Smoking packs/day:  1      Quit status:  Not considering quitting            Alcohol Use      Alcohol intake:  None            Substance Use      Substance use:  Denies use            REVIEW OF SYSTEMS      General:  Admits: Fatigue;          Denies: Appetite Change, Fever, Night Sweats, Weight Gain, Weight Loss      Eye:  Denies: Blurred Vision, Corrective Lenses, Diplopia, Eye Irritation, Eye     Pain, Eye Redness, Spots in Vision, Vision Loss      ENT:  Denies: Headache, Hearing Loss, Hoarseness, Seizures, Sinus Congestion,     Sore Throat      Cardiovascular:  Denies: Chest Pain, Edema Ankles, Edema Legs, Irregular     Heartbeat, Palpitations      Respiratory:  Denies: Coughing Blood, Productive Cough, Shortness of Air,     Wheezing      Gastrointestinal:  Denies: Bloody Stools, Constipation, Diarrhea, Frequent     Heartburn, Nausea, Problem Swallowing, Tarry Stools, Unable to Control Bowels,     Vomiting      Genitourinary (female):  Denies: Blood in Urine, Decrease Urine Stream, Frequent    Urination, Incontinence, Painful Urination      Musculoskeletal:  Denies: Back Pain, Leg Cramps, Muscle Pain, Muscle Weakness,     Painful Joints, Swollen Joints      Neurologic:  Denies: Dizziness, Fainting, Numbness\Tingling, Paralysis, Seizures      Psychiatric:  Admits: Anxiety;          Denies: Confused, Depression, Disoriented, Memory Loss      Endocrine:  Denies: Cold Intolerance, Diabetes, Excessive Sweating, Excessive     Thirst, Excessive Urination, Heat Intolerance, Flushing, Hyperthyroidism,     Hypothyroidism             VITAL SIGNS AND SCORES      Vitals      Height 4 ft 11.84 in / 152 cm      Weight 136 lbs 15.972 oz / 62.142 kg      BSA 1.59 m2      BMI 26.9 kg/m2      Temperature 98.3 F / 36.83 C - Temporal      Pulse 65      Blood Pressure 128/51 Sitting, Right Arm      Pulse Oximetry 96%, RM AIR            Pain Score      Experiencing any pain?:  No      Pain Scale Used:  Numerical      Pain Intensity:  0            Fatigue Score      Experiencing any fatigue?:  No      Fatigue (0-10 scale):  0 (none)            EXAM      General Appearance:  Positive for: Alert, Oriented x3, Cooperative      Eye:  Positive for: Anicteric Sclerae, Moist Conjunctiva, PERRLA      HEENT:  Positive for: Oropharynx clear;          Negative for: Dentition      Neck:  Negative for: Masses      Respiratory:  Positive for: CTAB, Normal Respiratory Effort      Abdomen/Gastro:  Positive for: Normal Active Bowel Sounds;          Negative for: Hepatosplenomegaly      Cardiovascular:  Positive for: Normal PMI;          Negative for: Murmur      Skin:  Positive for: Normal Temperature;          Negative for: Normal Texture and Turgor      Psychiatric:  Positive for: AAO X 3, Appropriate Affect      Neurologic:  Positive for: Cranial Ner II-XII Intact, Deep Tendon Reflexes      Musculoskeletal:  Positive for: Full ROM Lower Extremety, Full ROM Upper     Extremety, Full Muscle Strength      Lower Extremities:  Positive for: Normal Gait and Station;          Negative for: Edema      Lymphatic:  Negative for: Axillary, Cervical, Epitrochlear, Femoral,     Infraclavicular, Inguinal, Occipital, Popliteal, Posterior auricular, Preauri    cular, Supraclavicular            PREVENTION      Hx Influenza Vaccination:  Yes (fall 2017)      Date Influenza Vaccine Given:  Oct 1, 2017      2 or More Falls Past Year?:  No      Fall Past Year with Injury?:  No      Hx Pneumococcal Vaccination:  No      Encouraged to follow-up with:  PCP regarding preventative exams.       Chart initiated by      SONIA BENAVIDES [facility]            ALLERGY/MEDS      Allergies      Coded Allergies:             SULFA (SULFONAMIDE ANTIBIOTICS) (Verified  Allergy, Unknown, RASH, 7/3/18)      Uncoded Allergies:             iv contrast dye (Allergy, Unknown, RASH, 6/29/18)            Medications      Last Reconciled on 11/24/18 16:13 by SAUL RAMIREZ MD      Cyanocobalamin (B-12 DOTS) 500 Mcg Tab      500 MCG PO BID for 30 Days, #60 TAB 2 Refills         Reported         8/7/18       Neb-Budesonide (Budesonide) 0.25 Mg/2 Ml Ampul.neb      0.25 MG INH TID, #60 NEB         Reported         8/7/18       Esomeprazole Mag (NexIUM) 10 Mg Suspdr.pkt      10 MG PO QDAY@07, #30 PACKET 0 Refills         Reported         8/7/18       Promethazine Hcl (Phenergan*) 12.5 Mg Tablet      12.5 MG PO Q6H PRN for NAUSEA AND/OR VOMITING, TAB 0 Refills         Reported         7/3/18       Dicyclomine Hcl (DICYCLOMINE HCL) 10 Mg Capsule      10 MG PO TID PRN for CRAMPS, CAP         Reported         7/3/18       ALPRAZolam (Xanax) 0.5 Mg Tab      0.5 MG PO Q8HR PRN for ANXIETY, TAB 0 Refills         Reported         7/3/18       Furosemide* (Lasix*) 40 Mg Tablet      40 MG PO QDAY PRN for EDEMA, #30 TAB 0 Refills         Reported         7/3/18       Metformin Hcl* (Metformin Hcl*) 850 Mg Tablet      850 MG PO BID, #60 TAB 0 Refills         Reported         7/3/18       Gabapentin (Gabapentin) 300 Mg Capsule      300 MG PO TID, #90 CAP 0 Refills         Reported         7/3/18       rOPINIRole HCl (rOPINIRole HCl) 3 Mg Tablet      3 MG PO BID for 30 Days, #60 TAB         Reported         7/3/18       Triamcinolone Acetonide 0.1% Oint (Triamcinolone Acetonide 0.1% Oint) 454 Gm     Oint...g.      1 APL TOPICAL QDAY, #1 TUBE         Reported         7/3/18       Mupirocin (Bactroban 2% Oint) 22 Gm Oint...g.      1 APL TOPICAL BID, #1 TUBE         Reported         7/3/18       Nitroglycerin (Nitroglycerin) 0.4 Mg Tab.subl      0.4 MG  SL Q5MIN PRN for CHEST PAIN, TAB         Reported         7/3/18       Levothyroxine (Levothyroxine) 0.112 Mg Tablet      0.112 MG PO QDAY@07, #30 TAB 0 Refills         Reported         7/3/18       Metoprolol Succinate (Toprol XL*) 25 Mg Tab.er.24h      25 MG PO QDAY, #30 TAB 0 Refills         Reported         7/3/18       Clopidogrel Bisulfate (Plavix) 75 Mg Tablet      75 MG PO QDAY, TAB         Reported         7/3/18       Glimepiride (Glimepiride*) 2 Mg Tablet      2 MG PO QDAY, TAB         Reported         7/3/18       Amlodipine/Benazepril Hcl (Amlodipine/Benazepril 5/20 Mg) 1 Each Capsule      1 CAP PO BID, #30 CAP         Reported         7/3/18       Hydrochlorothiazide (Hydrochlorothiazide*) 25 Mg Tablet      25 MG PO QDAY, #30 TAB 0 Refills         Reported         7/3/18       Pantoprazole (Protonix*) 40 Mg Tablet.dr      40 MG PO HS, #30 TAB 0 Refills         Reported         7/3/18       Atorvastatin (Atorvastatin) 10 Mg Tablet      10 MG PO HS, #30 TAB         Reported         7/3/18       Insulin Glargine (Lantus SOLOSTAR) 100 Unit/1 Ml Insuln.pen      30 UNITS SUBQ HS, #1 BOX 0 Refills         Reported         7/3/18       Oscar-Fluticasone (Fluticasone 50 mcg) 16 Gm Spray.susp      2 PUFFS NARE EACH QDAY, #1 BOTTLE 0 Refills         Reported         7/3/18            IMPRESSION/PLAN      Diagnosis      Leukocytosis - D72.829      -WBC last time was 20.4.       -No signs or symptoms of infection today.       -Check CBC today.             Anemia - D64.9      -Hemoglobin levels last time was 13.6      -improved      -Check CBC today.             Vitamin B12 deficiency - E53.8      -Continue B12 supplementation      -Stable            GERD (gastroesophageal reflux disease) - K21.9      -Stable      -Still has intermittent reflux pain.       -Continue acid reflux medications.             Anxiety - F41.9      -Anxiety is well controlled today.      -Continue medications.             Notes      -Patient's  imaging exams, blood tests, physicians' notes, and any new findings     since our last clinic visit were reviewed today to reassess patient's medical     treatment plan. .       -Old medical records were re-reviewed and re-summarized in chronological order     in the HPI today to maintain an updated medical record.       -Patient's radiology imaging tests from our last visit were reviewed     independently by me by direct visualization of the images.        -Patients current lab tests and medications were carefully reviewed to evaluate     patient's current treatment plan today.       -Patient was advised to call us right away if there was any new symptoms for an     urgent visit.  Patient voiced understanding and agreed to do so.            Plan      Counseled patient to call us for an urgent visit if needed.  Check blood tests     and/or imaging tests as shown above.  Return to clinic in 3 months.  Orders for     colonoscopy with GI requested.                 Disclaimer: Converted document may not contain table formatting or lab diagrams. Please see [employer] for the authenticated document.

## 2021-05-28 NOTE — PROGRESS NOTES
Patient: HERMELINDA AGUILAR     Acct: [id number]     Report: #[id number]  UNIT #: [id number]     : 1940    Encounter Date:2018  PRIMARY CARE: PEMA MANNING  ***Signed***  --------------------------------------------------------------------------------------------------------------------  Visit Type      Established Patient Visit            Chief Complaint      leukocytosis            Referring Provider/Copies To      Referring Provider:  PEMA MANNING            Allergies      Coded Allergies:             SULFA (SULFONAMIDE ANTIBIOTICS) (Verified  Allergy, Unknown, RASH, 7/3/18)      Uncoded Allergies:             iv contrast dye (Allergy, Unknown, RASH, 18)            Medications      Last Reconciled on 18 10:43 by OPAL TRAN      Cyanocobalamin (B-12 Dots*) 500 Mcg Tab      500 MCG PO BID for 30 Days, #60 TAB 2 Refills         Reported         18       Neb-Budesonide (Budesonide) 0.25 Mg/2 Ml Ampul.neb      0.25 MG INH TID, #60 NEB         Reported         18       Esomeprazole Mag (NexIUM) 10 Mg Suspdr.pkt      10 MG PO QDAY@07, #30 PACKET 0 Refills         Reported         18       Promethazine Hcl (Phenergan*) 12.5 Mg Tablet      12.5 MG PO Q6H Y for NAUSEA AND/OR VOMITING, TAB 0 Refills         Reported         7/3/18       Dicyclomine Hcl (Dicyclomine*) 10 Mg Capsule      10 MG PO TID Y for CRAMPS, CAP         Reported         7/3/18       ALPRAZolam (Xanax) 0.5 Mg Tab      0.5 MG PO Q8HR Y for ANXIETY, TAB 0 Refills         Reported         7/3/18       Furosemide* (Lasix*) 40 Mg Tablet      40 MG PO QDAY Y for EDEMA, #30 TAB 0 Refills         Reported         7/3/18       Metformin Hcl* (Metformin Hcl*) 850 Mg Tablet      850 MG PO BID, #60 TAB 0 Refills         Reported         7/3/18       Gabapentin (Gabapentin) 300 Mg Capsule      300 MG PO TID, #90 CAP 0 Refills         Reported         7/3/18       rOPINIRole HCl (rOPINIRole HCl) 3 Mg Tablet      3  MG PO BID for 30 Days, #60 TAB         Reported         7/3/18       Triamcinolone Acetonide 0.1% Oint (Triamcinolone Acetonide 0.1% Oint) 454 Gm     Oint...g.      1 APL TOPICAL QDAY, #1 TUBE         Reported         7/3/18       Mupirocin (Bactroban 2% Oint) 22 Gm Oint...g.      1 APL TOPICAL BID, #1 TUBE         Reported         7/3/18       Nitroglycerin (Nitroglycerin) 0.4 Mg Tab.subl      0.4 MG SL Q5MIN Y for CHEST PAIN, TAB         Reported         7/3/18       Levothyroxine (Levothyroxine) 0.112 Mg Tablet      0.112 MG PO QDAY@07, #30 TAB 0 Refills         Reported         7/3/18       Metoprolol Succinate (Toprol XL*) 25 Mg Tab.er.24h      25 MG PO QDAY, #30 TAB 0 Refills         Reported         7/3/18       Clopidogrel Bisulfate (Plavix) 75 Mg Tablet      75 MG PO QDAY, TAB         Reported         7/3/18       Glimepiride (Glimepiride*) 2 Mg Tablet      2 MG PO QDAY, TAB         Reported         7/3/18       Amlodipine/Benazepril Hcl (Amlodipine/Benazepril 5/20 Mg) 1 Each Capsule      1 CAP PO BID, #30 CAP         Reported         7/3/18       Hydrochlorothiazide (Hydrochlorothiazide*) 25 Mg Tablet      25 MG PO QDAY, #30 TAB 0 Refills         Reported         7/3/18       Pantoprazole (Protonix*) 40 Mg Tablet.dr      40 MG PO HS, #30 TAB 0 Refills         Reported         7/3/18       Atorvastatin (Atorvastatin) 10 Mg Tablet      10 MG PO HS, #30 TAB         Reported         7/3/18       Insulin Glargine (Lantus SOLOSTAR) 100 Unit/1 Ml Insuln.pen      30 UNITS SUBQ HS, #1 BOX 0 Refills         Reported         7/3/18       Oscar-Fluticasone (Fluticasone 50 mcg) 16 Gm Spray.susp      2 PUFFS NARE EACH QDAY, #1 BOTTLE 0 Refills         Reported         7/3/18      Medications Reviewed:  Changes made to meds            History and Present Illness      HPI - Hematology      Mrs. Angel is a very pleasant 77-year-old lady who is being referred to us     with anemia and leukocytosis. Patient has history of  recurrent colitis as well     as urinary tract infections which have been treated with oral antibiotics. Most     recently patient was taking ciprofloxacin twice a day for one week which she     completed last week. Patient was complaining of diarrhea or vomiting and     abdominal discomfort which has somewhat improved but not completely resolved.     In addition to leukocytosis patient was also found to be anemic. She has     previous history of EGD and colonoscopy in 2013. Patient is in a wheelchair is     complaining of lower extremity edema.            -August 7, 2018. Presents in WC with son for follow up for iron deficiency     anemia and leukocytosis. Received Fereheme infusions x 2 in June and July.     Feels much better after receiving. Taste alterations and tongue color has     improved. Fatigue has improved. WBC up to 18.0 at the last visit. Denies any B     symptoms. Has chronic skin cancers removed from lower extremities on a frequent     basis and therefore has increased lower extremity edema. Takes Lasix PRN. Also,     has heart murmur and anemia may be related chronic disease. B-12 of 388 on the     low end of normal. Will start patient on oral B-12, 500mcg,SL BID x 3 months     and recheck at the next visit as this may help to improve her fatigue.            PAST, FAMILY   Past Medical History      Past Medical History:  Diabetes Type 2, Thyroid Disease, High Cholesterol,     Heart Attack (X 2), Low or High RBC Count, Low or High WBC Count      Hematology/oncology:  REPORTS HX OF: Anemia, Skin cancer            Past Surgical History      REPORTS HX OF: Coronary stent, Appendectomy, Skin cancer removal, Hysterectomy            Family History      REPORTS HX OF: Skin Cancer (BROTHER)            Social History      Lives independently:  Yes      Occupation:  RETIRED            Tobacco Use      Tobacco status:  Current every day smoker      Smoking packs/day:  1      Quit status:  Not considering  quitting            Alcohol Use      Alcohol intake:  None            Substance Use      Substance use:  Denies use            REVIEW OF SYSTEMS      General:  Complains of: Fatigue, Denies: Appetite change, Excessive sweating,     Fever, Night sweats, Weight gain, Weight loss, Other      Eyes:  Denies: Blurred vision, Corrective lenses, Diplopia, Eye irritation, Eye     pain, Eye redness, Spots in vision, Vision loss, Other      Ears, nose, mouth, throat:  Denies: Headache, Seizures, Visual Changes, Hearing     loss, Sinus Congestion, Hoarseness, Sore throat, Other      Cardiovascular:  Denies: Chest pain, Irregular heartbeat, Palpitations, Swollen     ankles/legs, Other      Respiratory:  Denies: Chest pain, Shortness of Air, Productive cough, Coughing     blood, Other      Gastrointestinal:  Denies: Nausea, Vomiting, Problem swallowing, Frequent     heartburn, Constipation, Diarrhea, Tarry stools, Bloody stools, Unable to     control bowels, Other      Kidney/Bladder:  Denies: Painful Urination, Change in urinary stream, Blood in     urine, Incontinence, Frequent Urination, Decreased urine stream, Other      Musculoskeletal:  Complains of: Muscle weakness, Denies: New Back pain, Leg     Cramps, Painful Joints, Swollen Joints, Muscle Pain, Other      Skin:  DENIES: Jaundice, Easy Bleeding, Lesions/changes in moles, Nail changes,     Skin Discoloration, Rash, Other      Neurological:  Denies: Dizziness, Fainting, Numbness\Tingling, Paralysis,     Seizures, Other      Psychiatric:  Complains of: AAO X 3, Denies: Anxiety, Panic attacks, Depression    , Memory loss, Other      Endocrine:  DiabetesThyroid DisorderOsteoporosisEndocrine Other      Hematologic/lymphatic:  Complains of: Bruising, Denies: Bleeding, Enlarged     Lymph Nodes, Recurrent infections, Other      Reproductive:  Complains of Menopause, Denies Pregnant, Denies Still     Menstruating, Denies Heavy Periods, Denies Other            VITAL  SIGNS,PAIN/FATIGUE SCORE      Vitals      Height 5 ft 0.04 in / 152.5 cm      Weight 137 lbs 5.545 oz / 62.3 kg      BSA 1.64 m2      BMI 26.8 kg/m2      Temperature 97.9 F / 36.61 C - Temporal      Pulse 75      Blood Pressure 145/56 Sitting, Right Arm      Pulse Oximetry 97%, room air            Pain Score      Experiencing any pain?:  No      Pain Scale Used:  Numerical      Pain Intensity:  0            Fatigue Score      Experiencing any fatigue?:  Yes      Fatigue (0-10 scale):  3            General Appearance:  Alert, Oriented X3, Cooperative, No acute distress      Eyes:  Anicteric Sclerae, Moist Conjunctiva, PERRLA      HEENT:  No Erythema      Neck:  Supple, Full ROM, No Carotid Bruits      Respiratory:  CTAB, Diminished Breath, No Rales      Abdomen\Gastro:  Soft, No NABS      Cardio:  RRR, No Murmur, No No, Peripheral Edema (lower extremity swelling in     bilateral legs.. )      Skin:  Normal Temperature, Normal Tone, No No Rash, lesions, ulcers (Lower     extremity rash to legs related to chronic squamous skin cancers removed from     legs. )      Psychiatric:  Appropriate Affect, Intact Judgement, AAO x3      Neuro:  Cranial Nerve II-XII Inta, No Focal Sensory Deficit      Muscularskeletal:  Full ROM of extremeties, No Full muscle strength\tone      Extremities:  No Digital Cyanosis, Pedal Pulses Intact, Pedal Pulses Symetrical    , Weakness      Lymphatic:  No Cervical, No Infraclavicular, No Axillary            PREVENTION      Hx Influenza Vaccination:  Yes (fall 2017)      Date Influenza Vaccine Given:  Oct 1, 2017      2 or More Falls Past Year?:  No      Fall Past Year with Injury?:  No      Hx Pneumococcal Vaccination:  No      Encouraged to follow-up with:  PCP regarding preventative exams.      Chart initiated by      Sidra Temple cma            IMPRESSION/PLAN      Impression            Mrs. Angel comes for a follow-up visit her diarrhea has improved with     antibiotics. But she is found to  have significant iron deficiency with iron     saturation of only 7% and ferritin was 9. Her B12 and folate were within normal     limits I had a long discussion about iron supplementation and patient will like     to proceed with intravenous iron replacement therapy with Feraheme  2 infusion     and then we will see her back with a CBC to make sure her hemoglobin has     improved and leukocytosis has resolved. Patient is accompanied by her son today     and their questions were uncertain to their satisfaction.            Leukocytosis      Recheck CBC with diff and peripheral smear.      Patient denies any B symptoms.       Recent UTI and completed Cipro.             Anemia      Iron panel consistent with SHEFALI      B-12 on low end of normal.       Fereheme infusions in June and July.       Recheck iron panel, CBC today.       Started on oral B-12, 500 mcg BID, SL x 3 months.       Recheck b-12 in 3 months.             Current Plan: August 7, 2018.       -Recheck iron panel, ferritin, CBC with diff and peripheral smear.       -Patient will take oral Vitamin B-12 500 mcg, 1 tab BID, SL x 3 months since B-    12 is on the low end of normal.       -Return in 3 months for follow up with Dr. Falcon.       -Continue close observation. Denies any B-symptoms.            Diagnosis      Iron deficiency anemia - D50.9            Notes      New Medications      * Esomeprazole Mag (NexIUM) 10 MG SUSPDR.PKT: 10 MG PO QDAY@07 #30       Instructions: Take on an empty stomach, one hour before or two hours after     meal.      * Neb-Budesonide (Budesonide) 0.25 MG/2 ML AMPUL.NEB: 0.25 MG INH TID #60       Instructions: DIAGNOSIS CODE REQUIRED PRIOR TO PRESCRIBING.      * CYANOCOBALAMIN (B-12 Dots*) 500 MCG TAB: 500 MCG PO BID 30 Days #60      New Diagnostics      * CBC, As Soon As Possible       Dx: Iron deficiency anemia - D50.9      * Path Review Peripheral Smear, Stat       Dx: Iron deficiency anemia - D50.9      * Iron Profile, Stat        Dx: Iron deficiency anemia - D50.9      * Ferritin Level, As Soon As Possible       Dx: Iron deficiency anemia - D50.9                 Disclaimer: Converted document may not contain table formatting or lab diagrams. Please see [employer] for the authenticated document.